# Patient Record
Sex: MALE | HISPANIC OR LATINO
[De-identification: names, ages, dates, MRNs, and addresses within clinical notes are randomized per-mention and may not be internally consistent; named-entity substitution may affect disease eponyms.]

---

## 2024-02-27 ENCOUNTER — APPOINTMENT (OUTPATIENT)
Dept: UROLOGY | Facility: CLINIC | Age: 59
End: 2024-02-27
Payer: MEDICAID

## 2024-02-27 VITALS
DIASTOLIC BLOOD PRESSURE: 70 MMHG | WEIGHT: 165 LBS | HEIGHT: 66 IN | HEART RATE: 89 BPM | TEMPERATURE: 98.1 F | BODY MASS INDEX: 26.52 KG/M2 | SYSTOLIC BLOOD PRESSURE: 110 MMHG | OXYGEN SATURATION: 98 %

## 2024-02-27 DIAGNOSIS — Z12.5 ENCOUNTER FOR SCREENING FOR MALIGNANT NEOPLASM OF PROSTATE: ICD-10-CM

## 2024-02-27 DIAGNOSIS — N40.0 BENIGN PROSTATIC HYPERPLASIA WITHOUT LOWER URINARY TRACT SYMPMS: ICD-10-CM

## 2024-02-27 PROBLEM — Z00.00 ENCOUNTER FOR PREVENTIVE HEALTH EXAMINATION: Status: ACTIVE | Noted: 2024-02-27

## 2024-02-27 PROCEDURE — 99204 OFFICE O/P NEW MOD 45 MIN: CPT

## 2024-02-27 RX ORDER — TAMSULOSIN HYDROCHLORIDE 0.4 MG/1
0.4 CAPSULE ORAL
Qty: 30 | Refills: 5 | Status: ACTIVE | COMMUNITY
Start: 2024-02-27 | End: 1900-01-01

## 2024-02-27 RX ORDER — DIBASIC SODIUM PHOSPHATE, MONOBASIC SODIUM PHOSPHATE 7; 19 G/118ML; G/118ML
7-19 ENEMA RECTAL
Qty: 1 | Refills: 0 | Status: ACTIVE | COMMUNITY
Start: 2024-02-27 | End: 1900-01-01

## 2024-02-27 NOTE — HISTORY OF PRESENT ILLNESS
[FreeTextEntry1] : Mr. JONES is a 58 year-year-old Unknown  M who comes today to clinic for urinary symptoms. Endorsing moderate LUTS for the last 2 years (see IPSS), both voiding and storage symptoms. No family history of Prostate cancer. Denies fevers, chills, flank pain, hematuria, AUR.

## 2024-02-27 NOTE — ASSESSMENT
[FreeTextEntry1] : 58-year-old male with lower urinary tract symptoms.  Discussed tamsulosin trial, patient amenable.  Transrectal ultrasound of the prostate in 1 week and 6-week follow-up for uroflow PVR IPSS.  We discussed the effects that BPH can cause on the bladder: BPH can cause detrusor overactivity which results in urgency and frequency and in some cases, incontinence. Over a longer time,some men may develop large volume/acontractile bladders, while other men develop small or normal volume bladders with loss of compliance. Neither is ideal and both represent end stage bladder damage that can not be fixed and can cause kidney injury. I explained the 3 main jobs of the bladder which are to 1) store urine, 2) evacuate urine and 3) keep urine at low pressure to prevent upper tract injury. I explained the mechanisms of urinary tract infections, hydronephrosis and kidney injury due to urinary retention.  These consequences can be severe, irreversible and even life threatening.   We then discussed the treatment options for BPH. We generally start with medical management and proceed to surgery if necessary. Alpha blockers act to relax the prostate while 5-alpha reductase inhibitors (5-Joe) shrink the prostate. 5-LETTY's are most effective in patients who have prostates that are >40gm. The former are fairly fast acting (in a matter of days-weeks) while the latter can take up to 3-6 months to take effect.

## 2024-02-27 NOTE — REVIEW OF SYSTEMS
[Urine retention] : urine retention [see HPI] : see HPI [Wake up at night to urinate  How many times?  ___] : wakes up to urinate [unfilled] times during the night [Negative] : Heme/Lymph

## 2024-02-27 NOTE — SIGNATURES
[TextEntry] : René Conklin M.D. Minimally Invasive and Robotic Urologic Surgery [facility] | [facility]  of Urology [employer] at [facility] Tel: [phone] | Fax: [phone] | email: [email or web address]

## 2024-02-28 LAB
APPEARANCE: ABNORMAL
BACTERIA: NEGATIVE /HPF
BILIRUBIN URINE: NEGATIVE
BLOOD URINE: NEGATIVE
CAST: 0 /LPF
COLOR: YELLOW
EPITHELIAL CELLS: 0 /HPF
GLUCOSE QUALITATIVE U: NEGATIVE MG/DL
KETONES URINE: NEGATIVE MG/DL
LEUKOCYTE ESTERASE URINE: NEGATIVE
MICROSCOPIC-UA: NORMAL
NITRITE URINE: NEGATIVE
PH URINE: 7.5
PROTEIN URINE: NEGATIVE MG/DL
PSA FREE FLD-MCNC: 22 %
PSA FREE SERPL-MCNC: 0.27 NG/ML
PSA SERPL-MCNC: 1.23 NG/ML
RED BLOOD CELLS URINE: 2 /HPF
SPECIFIC GRAVITY URINE: 1.02
UROBILINOGEN URINE: 0.2 MG/DL
WHITE BLOOD CELLS URINE: 1 /HPF

## 2024-02-29 LAB — BACTERIA UR CULT: NORMAL

## 2024-03-05 ENCOUNTER — APPOINTMENT (OUTPATIENT)
Dept: UROLOGY | Facility: CLINIC | Age: 59
End: 2024-03-05
Payer: COMMERCIAL

## 2024-03-05 VITALS
OXYGEN SATURATION: 98 % | BODY MASS INDEX: 26.52 KG/M2 | TEMPERATURE: 98 F | SYSTOLIC BLOOD PRESSURE: 120 MMHG | DIASTOLIC BLOOD PRESSURE: 70 MMHG | HEIGHT: 66 IN | HEART RATE: 83 BPM | WEIGHT: 165 LBS

## 2024-03-05 PROCEDURE — 76872 US TRANSRECTAL: CPT

## 2024-04-09 ENCOUNTER — APPOINTMENT (OUTPATIENT)
Dept: UROLOGY | Facility: CLINIC | Age: 59
End: 2024-04-09
Payer: COMMERCIAL

## 2024-04-09 ENCOUNTER — APPOINTMENT (OUTPATIENT)
Dept: UROLOGY | Facility: CLINIC | Age: 59
End: 2024-04-09

## 2024-04-09 VITALS
WEIGHT: 165 LBS | OXYGEN SATURATION: 98 % | DIASTOLIC BLOOD PRESSURE: 70 MMHG | HEART RATE: 99 BPM | TEMPERATURE: 98.2 F | HEIGHT: 66 IN | SYSTOLIC BLOOD PRESSURE: 120 MMHG | BODY MASS INDEX: 26.52 KG/M2

## 2024-04-09 DIAGNOSIS — N40.1 BENIGN PROSTATIC HYPERPLASIA WITH LOWER URINARY TRACT SYMPMS: ICD-10-CM

## 2024-04-09 DIAGNOSIS — N13.8 BENIGN PROSTATIC HYPERPLASIA WITH LOWER URINARY TRACT SYMPMS: ICD-10-CM

## 2024-04-09 PROCEDURE — 99213 OFFICE O/P EST LOW 20 MIN: CPT

## 2024-04-09 PROCEDURE — 51741 ELECTRO-UROFLOWMETRY FIRST: CPT

## 2024-04-09 PROCEDURE — 51798 US URINE CAPACITY MEASURE: CPT

## 2024-04-09 NOTE — ASSESSMENT
[FreeTextEntry1] : 58-year-old male with lower urinary tract symptoms.Patient on daily tamsulosin 0.4 mg, remarkable improvement in symptoms. Follow-up for uroflow PVR IPSS.  We discussed the effects that BPH can cause on the bladder: BPH can cause detrusor overactivity which results in urgency and frequency and in some cases, incontinence. Over a longer time,some men may develop large volume/acontractile bladders, while other men develop small or normal volume bladders with loss of compliance. Neither is ideal and both represent end stage bladder damage that can not be fixed and can cause kidney injury. I explained the 3 main jobs of the bladder which are to 1) store urine, 2) evacuate urine and 3) keep urine at low pressure to prevent upper tract injury. I explained the mechanisms of urinary tract infections, hydronephrosis and kidney injury due to urinary retention.  These consequences can be severe, irreversible and even life threatening.   We then discussed the treatment options for BPH. We generally start with medical management and proceed to surgery if necessary. Alpha blockers act to relax the prostate while 5-alpha reductase inhibitors (5-Joe) shrink the prostate. 5-LETTY's are most effective in patients who have prostates that are >40gm. The former are fairly fast acting (in a matter of days-weeks) while the latter can take up to 3-6 months to take effect.

## 2024-04-09 NOTE — SIGNATURES
[TextEntry] : René Conklin M.D. Minimally Invasive and Robotic Urologic Surgery [facility] | [facility]  of Urology [employer] at [facility] Tel: [phone] | Fax: [phone] | email: [email or web address]

## 2024-04-09 NOTE — HISTORY OF PRESENT ILLNESS
[FreeTextEntry1] : Mr. JONES is a 58 year-year-old Unknown  M who comes today to clinic for urinary symptoms. Endorsing moderate LUTS for the last 2 years (see IPSS), both voiding and storage symptoms. No family history of Prostate cancer.  4/9/2024 Patient on daily tamsulosin 0.4 mg, remarkable improvement in symptoms. Uroflow low volume, PVR 0 cc. Denies fevers, chills, flank pain, hematuria, AUR.

## 2024-04-09 NOTE — REVIEW OF SYSTEMS
[see HPI] : see HPI [Urine retention] : urine retention [Wake up at night to urinate  How many times?  ___] : wakes up to urinate [unfilled] times during the night [Negative] : Heme/Lymph

## 2024-04-09 NOTE — REASON FOR VISIT
[Follow-up Visit ___] : a follow-up visit  for [unfilled] [Other: _____] : [unfilled] [Initial Visit ___] : [unfilled] is here today for an initial visit  for [unfilled]

## 2024-07-09 ENCOUNTER — APPOINTMENT (OUTPATIENT)
Dept: UROLOGY | Facility: CLINIC | Age: 59
End: 2024-07-09
Payer: COMMERCIAL

## 2024-07-09 VITALS
HEIGHT: 66 IN | HEART RATE: 82 BPM | WEIGHT: 165 LBS | SYSTOLIC BLOOD PRESSURE: 110 MMHG | DIASTOLIC BLOOD PRESSURE: 80 MMHG | TEMPERATURE: 98.2 F | BODY MASS INDEX: 26.52 KG/M2 | OXYGEN SATURATION: 98 %

## 2024-07-09 DIAGNOSIS — N13.8 BENIGN PROSTATIC HYPERPLASIA WITH LOWER URINARY TRACT SYMPMS: ICD-10-CM

## 2024-07-09 DIAGNOSIS — N40.1 BENIGN PROSTATIC HYPERPLASIA WITH LOWER URINARY TRACT SYMPMS: ICD-10-CM

## 2024-07-09 PROCEDURE — 51741 ELECTRO-UROFLOWMETRY FIRST: CPT

## 2024-07-09 PROCEDURE — 99213 OFFICE O/P EST LOW 20 MIN: CPT

## 2024-07-09 PROCEDURE — 51798 US URINE CAPACITY MEASURE: CPT

## 2025-02-04 ENCOUNTER — APPOINTMENT (OUTPATIENT)
Dept: UROLOGY | Facility: CLINIC | Age: 60
End: 2025-02-04
Payer: COMMERCIAL

## 2025-02-04 VITALS
SYSTOLIC BLOOD PRESSURE: 134 MMHG | DIASTOLIC BLOOD PRESSURE: 82 MMHG | HEIGHT: 66 IN | OXYGEN SATURATION: 98 % | BODY MASS INDEX: 28.12 KG/M2 | TEMPERATURE: 98.3 F | HEART RATE: 74 BPM | RESPIRATION RATE: 17 BRPM | WEIGHT: 175 LBS

## 2025-02-04 DIAGNOSIS — N40.1 BENIGN PROSTATIC HYPERPLASIA WITH LOWER URINARY TRACT SYMPMS: ICD-10-CM

## 2025-02-04 DIAGNOSIS — N13.8 BENIGN PROSTATIC HYPERPLASIA WITH LOWER URINARY TRACT SYMPMS: ICD-10-CM

## 2025-02-04 PROCEDURE — 99214 OFFICE O/P EST MOD 30 MIN: CPT | Mod: 25

## 2025-02-04 PROCEDURE — 51741 ELECTRO-UROFLOWMETRY FIRST: CPT

## 2025-02-04 PROCEDURE — 51798 US URINE CAPACITY MEASURE: CPT

## 2025-02-05 LAB
PSA FREE FLD-MCNC: 14 %
PSA FREE SERPL-MCNC: 0.29 NG/ML
PSA SERPL-MCNC: 2.06 NG/ML

## 2025-08-12 ENCOUNTER — APPOINTMENT (OUTPATIENT)
Dept: UROLOGY | Facility: CLINIC | Age: 60
End: 2025-08-12
Payer: COMMERCIAL

## 2025-08-12 VITALS
OXYGEN SATURATION: 99 % | TEMPERATURE: 97.5 F | BODY MASS INDEX: 28.12 KG/M2 | HEIGHT: 66 IN | WEIGHT: 175 LBS | SYSTOLIC BLOOD PRESSURE: 122 MMHG | DIASTOLIC BLOOD PRESSURE: 80 MMHG | HEART RATE: 70 BPM

## 2025-08-12 DIAGNOSIS — Z12.5 ENCOUNTER FOR SCREENING FOR MALIGNANT NEOPLASM OF PROSTATE: ICD-10-CM

## 2025-08-12 DIAGNOSIS — N40.1 BENIGN PROSTATIC HYPERPLASIA WITH LOWER URINARY TRACT SYMPMS: ICD-10-CM

## 2025-08-12 DIAGNOSIS — N13.8 BENIGN PROSTATIC HYPERPLASIA WITH LOWER URINARY TRACT SYMPMS: ICD-10-CM

## 2025-08-12 PROCEDURE — 51798 US URINE CAPACITY MEASURE: CPT

## 2025-08-12 PROCEDURE — 99213 OFFICE O/P EST LOW 20 MIN: CPT
